# Patient Record
Sex: FEMALE | URBAN - METROPOLITAN AREA
[De-identification: names, ages, dates, MRNs, and addresses within clinical notes are randomized per-mention and may not be internally consistent; named-entity substitution may affect disease eponyms.]

---

## 2022-06-04 ENCOUNTER — TELEPHONE ENCOUNTER (OUTPATIENT)
Dept: URBAN - METROPOLITAN AREA CLINIC 68 | Facility: CLINIC | Age: 67
End: 2022-06-04

## 2022-06-05 ENCOUNTER — TELEPHONE ENCOUNTER (OUTPATIENT)
Dept: URBAN - METROPOLITAN AREA CLINIC 68 | Facility: CLINIC | Age: 67
End: 2022-06-05

## 2022-06-25 ENCOUNTER — TELEPHONE ENCOUNTER (OUTPATIENT)
Age: 67
End: 2022-06-25

## 2022-06-26 ENCOUNTER — TELEPHONE ENCOUNTER (OUTPATIENT)
Age: 67
End: 2022-06-26

## 2024-03-19 ENCOUNTER — OFFICE (OUTPATIENT)
Dept: URBAN - METROPOLITAN AREA CLINIC 72 | Facility: CLINIC | Age: 69
End: 2024-03-19

## 2024-03-19 VITALS
WEIGHT: 143 LBS | SYSTOLIC BLOOD PRESSURE: 120 MMHG | HEART RATE: 110 BPM | HEIGHT: 68 IN | DIASTOLIC BLOOD PRESSURE: 60 MMHG

## 2024-03-19 DIAGNOSIS — Z87.19 PERSONAL HISTORY OF OTHER DISEASES OF THE DIGESTIVE SYSTEM: ICD-10-CM

## 2024-03-19 DIAGNOSIS — R11.10 VOMITING, UNSPECIFIED: ICD-10-CM

## 2024-03-19 DIAGNOSIS — K46.9 UNSPECIFIED ABDOMINAL HERNIA WITHOUT OBSTRUCTION OR GANGRENE: ICD-10-CM

## 2024-03-19 DIAGNOSIS — Z90.49 ACQUIRED ABSENCE OF OTHER SPECIFIED PARTS OF DIGESTIVE TRACT: ICD-10-CM

## 2024-03-19 DIAGNOSIS — K20.90 ESOPHAGITIS, UNSPECIFIED WITHOUT BLEEDING: ICD-10-CM

## 2024-03-19 DIAGNOSIS — K56.609 UNSPECIFIED INTESTINAL OBSTRUCTION, UNSPECIFIED AS TO PARTIA: ICD-10-CM

## 2024-03-19 PROCEDURE — 99204 OFFICE O/P NEW MOD 45 MIN: CPT | Performed by: INTERNAL MEDICINE

## 2024-03-19 NOTE — SERVICEHPINOTES
Hellen Allen   is seen for an initial visit today.  
br
alicia   She has a history of uclerative colitis and had a total coletomy with end ileostomy in 1994.  She has no issues since then . 
br In october of last year she started having issues.  She was having a really severe pain and no ileostomy output for 8 hours so she went to the ER.  She was told that it was a blockage.  She spent 1-2 nights and they put a tube in her nose and she felt better and they discharged  She did not know the casue.  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  
br
alicia SHe started noticing vomiting about 2-3 years ago.  INitially it was cleaning, activity, bending.  She would start to get sweaty, belching and vomiting.  It comes on suddenly.  She is not nauseated. She was told that it was related to acid reflux.  Now it is happening even without activity but generally not as often (She has stopped doing as much activity so that has decreased the vomiting). 
br
alicia  She has belching, not really heartburn, foul smelling breath.  Hoarse voice, regurgitation but not really hearburn.  No trouble swallowing.  She was taking omeparzole and she stopped taking it because of concern for her kidney (she has stage 3 kidney disease)My nurse has reviewed and updated the medication list with the patient. I have reviewed the medication list along with the documented medical, social and family history. Pertinent details are also noted above in the HPI.

## 2024-05-16 ENCOUNTER — AMBULATORY SURGICAL CENTER (OUTPATIENT)
Dept: URBAN - METROPOLITAN AREA SURGERY 19 | Facility: SURGERY | Age: 69
End: 2024-05-16
Payer: MEDICARE

## 2024-05-16 DIAGNOSIS — R12 HEARTBURN: ICD-10-CM

## 2024-05-16 PROCEDURE — 43235 EGD DIAGNOSTIC BRUSH WASH: CPT | Performed by: INTERNAL MEDICINE

## 2024-06-18 ENCOUNTER — OFFICE (OUTPATIENT)
Dept: URBAN - METROPOLITAN AREA CLINIC 72 | Facility: CLINIC | Age: 69
End: 2024-06-18

## 2024-06-18 VITALS
DIASTOLIC BLOOD PRESSURE: 90 MMHG | DIASTOLIC BLOOD PRESSURE: 96 MMHG | SYSTOLIC BLOOD PRESSURE: 150 MMHG | SYSTOLIC BLOOD PRESSURE: 156 MMHG | OXYGEN SATURATION: 98 % | HEIGHT: 68 IN | WEIGHT: 143 LBS | HEART RATE: 81 BPM

## 2024-06-18 DIAGNOSIS — Z87.19 PERSONAL HISTORY OF OTHER DISEASES OF THE DIGESTIVE SYSTEM: ICD-10-CM

## 2024-06-18 DIAGNOSIS — K46.9 UNSPECIFIED ABDOMINAL HERNIA WITHOUT OBSTRUCTION OR GANGRENE: ICD-10-CM

## 2024-06-18 DIAGNOSIS — Z90.49 ACQUIRED ABSENCE OF OTHER SPECIFIED PARTS OF DIGESTIVE TRACT: ICD-10-CM

## 2024-06-18 DIAGNOSIS — K20.90 ESOPHAGITIS, UNSPECIFIED WITHOUT BLEEDING: ICD-10-CM

## 2024-06-18 PROCEDURE — 99213 OFFICE O/P EST LOW 20 MIN: CPT | Performed by: INTERNAL MEDICINE

## 2024-06-18 RX ORDER — FAMOTIDINE 40 MG/1
TABLET, FILM COATED ORAL
Qty: 120 | Refills: 6 | Status: ACTIVE
Start: 2024-06-18

## 2024-06-18 NOTE — SERVICENOTES
Our goal is to partner with you to improve your health and well being. It is important for you to complete necessary testing and follow the instructions given to you at your clinic visit. Our office will call you within 2 weeks with results of any testing but you may also call sooner to obtain results [phone].   If you have any questions or concerns please feel free to call.  We take your care very seriously and we thank you for your trust!
- Stop the pantoprazole
- start famotidine 40 mg twice a day, take that for 1 month and if you are doing OK, you can try to stop the famotidine and just take it as needed.  
- another approach is to try pantoprazole 20 mg every other day, Follow up as needed if symptoms are not improving or you have new or concerning symptoms.

## 2024-06-18 NOTE — SERVICEHPINOTES
Hellen Allen   is seen today for a follow-up visit.  
br
br   initially seen 3/24
Kim has a history of uclerative colitis and had a total coletomy with end ileostomy in 1994. She has no issues since then.brIn october of last year she started having issues. She was having a really severe pain and no ileostomy output for 8 hours so she went to the ER. She was told that it was a blockage. She spent 1-2 nights and they put a tube in her nose and she felt better and they discharged She did not know the casue. 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 SHe started noticing vomiting about 2-3 years ago. INitially it was cleaning, activity, bending. She would start to get sweaty, belching and vomiting. It comes on suddenly. She is not nauseated. She was told that it was related to acid reflux. Now it is happening even without activity but generally not as often (She has stopped doing as much activity so that has decreased the vomiting). She has belching, not really heartburn, foul smelling breath. Hoarse voice, regurgitation but not really hearburn. No trouble swallowing. She was taking omeparzole and she stopped taking it because of concern for her kidney (she has stage 3 kidney disease)br br  Plan from last visit:
Our goal is to partner with you to improve your health and well being. It is important for you to complete necessary testing and follow the instructions given to you at your clinic visit. Our office will call you within 2 weeks with results of any testing but you may also call sooner to obtain results - [phone]. If you have any questions or concerns please feel free to call us. We take your care very seriously and we thank you for your trust!br- for EGD no solid food fo 18 hours before. You can be on clear liquids during that time and then nothing by mouth for 4 hours before (I am concerned about stomach contents given regurgitation) br- start pantoprazole 40 mg once a daybr- repeat EGD in 8 weeksbr- , You are taking a medication called a proton pump inhibitor (PPI) which reduces the acid in your stomach. You should take this medication 15-30 min before a meal for it to be most effective. Generally patients take it before the first meal of the day. brThis is felt to be a very safe medication but as with any medication it can have risks or side effects. It may increase your risk of bone loss, especially in elderly, white females. Because of this, I generally recommend women (without renal failure) take a calcium citrate/magnesium/vit D supplement while on this medication. You can get this as a combo pill at most drug stores. There is a also a small increased risk of C diff infections especially in patients who are hospitalized and on antibiotics. Patients taking this medication long term should have yearly labs to check b12, vitamin D and mg level as deficiencies in these vitamins and minerals have been reported.br- follow up after egdbr- Referral placed to surgeon Interval History:  6/18/2024   
br   she saw Dr. Cuellar and although she felt that sh ehad a parastoma hernia he did not think intervention is appropriate at this time given min symptoms
br She is feeling a lot better after starting the pantoprazole.  SHe is taking that once a day. 
br She has osteoporosis and is on injections for that. 
br She also has stage 3 kidney disease.  br
br My nurse has reviewed and updated the medication list with the patient (medication reconciliation). I have also reviewed the medication list. New updates were made to the patient's medical, social and family history. Pertinent details are also noted above in the HPI.br visited="true"